# Patient Record
(demographics unavailable — no encounter records)

---

## 2025-01-02 NOTE — DISCUSSION/SUMMARY
[de-identified] : Chief complaint: Left hand injury  HPI: Patient is a 27-year-old right-hand-dominant male who presents to the office today for the evaluation of an injury to the left hand which was sustained at work on 12/31/2024.  Patient reports that his hand got caught between 2 pieces of machinery.  He sustained lacerations to the fourth and fifth fingers of the left hand.  He was brought to [facility] emergency department at which time three-view x-rays of the left wrist, left hand, and of the fourth and fifth fingers of the left hand were performed.  The x-ray showed a widely comminuted and mildly displaced fracture of the proximal phalanx of the fifth finger as well as a minimal fracture involving only the cortex of the distal end of the proximal phalanx of the fourth finger.  These fractures were considered open fractures given the lacerations to the fingers.  Patient was given a tetanus shot.  He was given IV abx. The lacerations were sutured.  There was an inpatient consult placed to hand surgery and surgical intervention was recommended however the patient states that he wanted to follow-up here.  ROS: Positive for fractures to left fourth and fifth fingers  Physical examination of the left upper extremity:  Patient is in an ulnar gutter/dorsal blocking splint including the third, fourth, and fifth digits of the left hand Patient has decreased sensation to the radial aspect of the distal phalanx of the fifth finger Sensation to the distal aspects of the remaining fingers is intact Capillary refill to all of the fingers of the left hand is less than 2 seconds  Patient presented to [facility] emergency department on 12/31/2024 at which time three-view x-rays of the left wrist were performed with impression as per the radiologist of negative x-rays of the left wrist  Three-view x-rays of the left fourth and fifth fingers were performed in the emergency department with diagnosis: There is a widely comminuted and mildly displaced fracture of the proximal phalanx of the fifth finger  Three-view x-rays of the left hand were performed with impression as per the radiologist: Widely comminuted and mildly displaced fracture of the proximal phalanx of the left fifth finger.  Minimal fracture involving only the cortex of the distal end of the proximal phalanx of the fourth finger  Patient later had three-view x-rays of the left hand performed status post casting and reduction with impression as per the radiologist: Status post casting and reduction of the fifth proximal phalanx comminuted fracture.  Three-view x-rays of the left fourth and fifth fingers performed in the office today show an acute displaced comminuted fracture of the proximal phalanx of the left fifth finger, no fracture is seen of the fourth finger however it should be noted that splint material obscures fine bony detail  Assessment/plan: Acute open displaced comminuted fracture of the proximal phalanx of the left fifth finger, fracture of the cortex of the distal end of the proximal phalanx of the left fourth finger, likely radial digital nerve injury to the left fifth finger, this case was discussed over the phone with Dr. Cuello, at this point in time surgical intervention is recommended, at the recommendation of Dr. Cuello this patient was directed to [facility] for admission to medicine, IV antibiotics, and preoperative testing for surgical intervention, the patient was provided with a prescription to bring to the emergency department, I also called the emergency department at [facility] and spoke to one of the emergency department providers in regards to this patient, the patient verbalized understanding of the recommendation for surgical intervention at this time, he agrees to go directly to [facility] for admission and eventual surgical intervention

## 2025-01-13 NOTE — REASON FOR VISIT
[FreeTextEntry2] : PO left CRPP little finger, extensor tendon repair and FDP repair of little finger and extensor repair of left ring finger 1/4/25

## 2025-01-13 NOTE — DISCUSSION/SUMMARY
[de-identified] : Sutures were removed.  The patient has custom splints from OT.  He will continue with his therapy.  He will make an appointment in about 2 to 3 weeks with Dr. Cuello for repeat x-ray and pin removal.  All questions were answered today.

## 2025-01-13 NOTE — DATA REVIEWED
[FreeTextEntry1] : 3 x-ray views taken in the office today of his left pinky finger show the fracture in anatomical alignment and all postsurgical hardware intact.

## 2025-01-13 NOTE — PHYSICAL EXAM
[de-identified] : Physical exam of his left hand: Resolving swelling and ecchymosis. The wounds are clean and dry. No signs of drainage, pus, or infection. Pins intact without any signs of infection. Tendons intact.

## 2025-01-13 NOTE — HISTORY OF PRESENT ILLNESS
[de-identified] : Patient is a 27 year M here for his first PO appt. He is status post a left CRPP little finger, extensor tendon repair and FDP repair of little finger and extensor repair of left ring finger done by Dr. Cuello. He is doing well.

## 2025-01-27 NOTE — PHYSICAL EXAM
[de-identified] : Dressing is removed.  His pins are clean dry and intact.  He has a healing traumatic lacerations.  Flexor mechanism is intact

## 2025-01-27 NOTE — ASSESSMENT
[FreeTextEntry1] : Patient had flexor tendon injury to the left little finger extensor tendon injury to the left little finger open fracture of the left little finger proximal phalanx and extensor tendon injury to the ring finger.  He is in a therapy program.  His pins were removed today.  He is initiated into more range of motion exercise program.  He will see me back in 3 weeks repeat the radiograph his left little finger.

## 2025-01-27 NOTE — DATA REVIEWED
[FreeTextEntry1] : Radiographs 3 views of the left little finger show good position alignment of the left little finger proximal phalanx fracture

## 2025-01-27 NOTE — PROCEDURE
[FreeTextEntry3] : In the office today under Betadine prep 2 superficial K wires were removed from his left little finger Betadine bandages applied

## 2025-01-27 NOTE — HISTORY OF PRESENT ILLNESS
[de-identified] : 27-year-old male had injury to his left hand little finger.  Also to his ring finger.  Underwent repair in the operating room.  And he comes in for the evaluation today.  He is in therapy he is not working he is totally disabled

## 2025-02-18 NOTE — PHYSICAL EXAM
[de-identified] : Flexor mechanism is intact.  Extensor mechanism is intact.  He has stiffness to active and passive range of motion.

## 2025-02-18 NOTE — DATA REVIEWED
[FreeTextEntry1] : Radiographs 3 views of the left little finger reviewed showing good alignment and healing of the left little finger proximal phalanx fracture.

## 2025-02-18 NOTE — ASSESSMENT
[FreeTextEntry1] : Spoke to therapist regarding his hand.  He can do more aggressive range of motion.  Discontinue the splint.  Possibility of tenolysis both flexor and extensor to the ring finger and little finger was discussed with the patient.  No need for radiographs at his next visit.  See me back in 3 weeks.  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  He says this is flagging.  He will continue with range of motion exercises discontinue splinting

## 2025-02-18 NOTE — HISTORY OF PRESENT ILLNESS
[de-identified] : 27-year-old male status post fixation of a left little finger proximal phalanx base fracture as well as extensor tendon and flexor tendon repair to the little finger.  Extensor tendon repair to the ring finger.  6 weeks in surgery

## 2025-03-17 NOTE — ASSESSMENT
[FreeTextEntry1] : Patient a left little finger flexor tendon laceration.  He underwent surgical repair.  He has stiffness.  He will require flexor tendon tenolysis.  Postoperative course including stopping work activities and then restarting work activities was discussed at length with the patient.  He will see us back in 6 weeks.  We are asking for authorization for surgery and postoperative therapy he has not progressed in therapy over the past several weeks.

## 2025-03-17 NOTE — HISTORY OF PRESENT ILLNESS
[de-identified] : 28-year-old male left little finger flexor tendon injury extensor tendon injury along with fracture from a work-related injury.  He has been in therapy.  He has been following his therapy program.  He has stiffness in the little finger.  He is working with a mild partial temporary disability.

## 2025-03-17 NOTE — PHYSICAL EXAM
[de-identified] : Patient is well-healed traumatic lacerations.  Good alignment of his hand.  Patient has stiffness of the little finger.  There is flexion at the DIP joint but it is scarred and stiff.

## 2025-04-21 NOTE — PHYSICAL EXAM
[de-identified] : Patient has intact flexor tendon function left little finger.  He has well-healed surgical incisions and traumatic lacerations.  He has a positive Tinel's and median nerve compression test at the wrist.

## 2025-04-21 NOTE — ASSESSMENT
[FreeTextEntry1] : Patient a left little finger flexor tendon laceration left little finger extensor tendon laceration he is also developed postoperative carpal tunnel syndrome secondary to his extensive therapy.  We need Workmen's Compensation authorization for an EMG for his left upper extremity to evaluate for carpal tunnel syndrome.  Then he would require carpal tunnel release surgery as well as left little finger flexor tendon tenolysis and left little finger extensor tendon tenolysis he will continue with the therapy program and a home exercise program.  He will see me in 6 weeks

## 2025-04-21 NOTE — HISTORY OF PRESENT ILLNESS
[de-identified] : 28-year-old male had a injury to his left little finger.  Resulting in extensor tendon laceration bony injury to the little finger proximal phalanx and flexor tendon laceration he underwent surgical repair he has been in therapy he got improvement in his range of motion but still has incomplete flexion of the left little finger.  He was also developed carpal tunnel syndrome during the postoperative rehabilitation.  He has been in therapy he is working with a mild partial temporary disability if he wears his brace he gets relief from his night symptoms of carpal tunnel otherwise he gets the numbness.  It does bother him throughout the day with numbness as well.  He has done well over 10 sessions of therapy

## 2025-06-02 NOTE — HISTORY OF PRESENT ILLNESS
[de-identified] : 28-year-old male had a crushing injury to his left little finger in December ended up with a fracture of the left little finger as well as extension flexor tendon injury as well.  Surgical repair.  Was compliant with his therapy program.  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  Has a mild partial temporary disability.  During the rehabilitation process for his left little finger he developed carpal tunnel syndrome.  As he was aggressively pursuing range of motion for his finger appropriately.  He is still bothered by the numbness and tingling in the finger.  He is able to work.  He did have an EMG done.

## 2025-06-02 NOTE — PHYSICAL EXAM
[de-identified] : Patient has intact flexor tendon function.  He can extend the little finger well.  He does not have full composite flexion of the finger.  He can flex the PIP joint well but there is minimal flexion with composite motion to the DIP joint.  Positive Tinel's and median nerve compression test on the left side.

## 2025-06-02 NOTE — ASSESSMENT
[FreeTextEntry1] : Patient had a left little finger fracture flexor and extensor tendon injury as well.  He has been met with stiffness.  He will require flexor tendon tenolysis extensor tendon tenolysis as well.  Also developed carpal tunnel syndrome during the rehabilitation process.  This is how his carpal tunnel is linked to his work related injury.  It is as a result of the intensive therapy program that he was pursuing.  He is bothered by the numbness and tingling in the fingers as well as the stiffness of the digit.  Surgical intervention for carpal tunnel release and flexor and extensor tenolysis was discussed again and are indicated and being requested he will see us back in 6 weeks

## 2025-07-16 NOTE — PHYSICAL EXAM
[de-identified] : Physical exam of his left hand: He has well-healed surgical scars.  There is little bit of the flexor contracture of the pinky finger.  He can flex the PIP joint well but there is minimal flexion with DIP joint flexion.  He has a positive Tinel, positive, positive median nerve compression.

## 2025-07-16 NOTE — DISCUSSION/SUMMARY
[de-identified] : Patient will continue to work light duty and do pain-free activities as tolerated.  He got approved for open carpal tunnel release and flexor tenosynovectomy which he is waiting to schedule until later in the year.  He will see Dr. Cuello in 6 weeks. He will contact the office for any questions or concerns.  He is at the same level of disability that he was at from prior visit.  All questions were answered.

## 2025-07-16 NOTE — HISTORY OF PRESENT ILLNESS
[de-identified] : Patient is a 28-year-old male here for repeat evaluation of his left little finger.  He is status post a left pinky finger flexor tendon injury and extensor tendon injury.  He underwent surgical repair.  Overall he is doing relatively well.  He still has symptoms of carpal tunnel syndrome which have been exacerbated by therapy so he has not been going to therapy.  He has been working light duty.  He is waiting to be scheduled for a flexor tenosynovectomy and carpal tunnel release.